# Patient Record
Sex: FEMALE | Race: BLACK OR AFRICAN AMERICAN | NOT HISPANIC OR LATINO | Employment: FULL TIME | URBAN - METROPOLITAN AREA
[De-identification: names, ages, dates, MRNs, and addresses within clinical notes are randomized per-mention and may not be internally consistent; named-entity substitution may affect disease eponyms.]

---

## 2020-02-21 LAB — POC BETA-HCG (QUAL): NEGATIVE

## 2022-04-11 ENCOUNTER — HISTORICAL (OUTPATIENT)
Dept: ADMINISTRATIVE | Facility: HOSPITAL | Age: 37
End: 2022-04-11

## 2022-04-24 VITALS
BODY MASS INDEX: 39.39 KG/M2 | DIASTOLIC BLOOD PRESSURE: 68 MMHG | SYSTOLIC BLOOD PRESSURE: 116 MMHG | HEIGHT: 70 IN | WEIGHT: 275.13 LBS

## 2022-05-01 ENCOUNTER — HOSPITAL ENCOUNTER (EMERGENCY)
Facility: HOSPITAL | Age: 37
Discharge: HOME | End: 2022-05-01
Attending: EMERGENCY MEDICINE
Payer: COMMERCIAL

## 2022-05-01 VITALS
OXYGEN SATURATION: 99 % | RESPIRATION RATE: 17 BRPM | SYSTOLIC BLOOD PRESSURE: 143 MMHG | BODY MASS INDEX: 41.16 KG/M2 | WEIGHT: 287.5 LBS | HEART RATE: 74 BPM | HEIGHT: 70 IN | DIASTOLIC BLOOD PRESSURE: 103 MMHG | TEMPERATURE: 98 F

## 2022-05-01 DIAGNOSIS — M51.36 LUMBAR DEGENERATIVE DISC DISEASE: ICD-10-CM

## 2022-05-01 DIAGNOSIS — M54.31 SCIATICA OF RIGHT SIDE: Primary | ICD-10-CM

## 2022-05-01 LAB
APPEARANCE UR: ABNORMAL
B-HCG UR QL: NEGATIVE
BACTERIA #/AREA URNS AUTO: ABNORMAL /HPF
BILIRUB UR QL STRIP.AUTO: NEGATIVE MG/DL
COLOR UR AUTO: COLORLESS
CTP QC/QA: YES
GLUCOSE UR QL STRIP.AUTO: NORMAL MG/DL
HYALINE CASTS #/AREA URNS LPF: ABNORMAL /LPF
KETONES UR QL STRIP.AUTO: NEGATIVE MG/DL
LEUKOCYTE ESTERASE UR QL STRIP.AUTO: 75 UNIT/L
NITRITE UR QL STRIP.AUTO: NEGATIVE
PH UR STRIP.AUTO: 6 [PH]
PROT UR QL STRIP.AUTO: NEGATIVE MG/DL
RBC UR QL AUTO: ABNORMAL UNIT/L
SP GR UR STRIP.AUTO: 1.02
SQUAMOUS #/AREA URNS LPF: ABNORMAL /HPF
UROBILINOGEN UR STRIP-ACNC: 0.2 MG/DL
WBC #/AREA URNS AUTO: ABNORMAL /HPF

## 2022-05-01 PROCEDURE — 99284 EMERGENCY DEPT VISIT MOD MDM: CPT | Mod: 25

## 2022-05-01 PROCEDURE — 87077 CULTURE AEROBIC IDENTIFY: CPT | Performed by: EMERGENCY MEDICINE

## 2022-05-01 PROCEDURE — 81001 URINALYSIS AUTO W/SCOPE: CPT | Performed by: PHYSICIAN ASSISTANT

## 2022-05-01 PROCEDURE — 63600175 PHARM REV CODE 636 W HCPCS: Performed by: PHYSICIAN ASSISTANT

## 2022-05-01 PROCEDURE — 96372 THER/PROPH/DIAG INJ SC/IM: CPT | Performed by: PHYSICIAN ASSISTANT

## 2022-05-01 PROCEDURE — 81025 URINE PREGNANCY TEST: CPT | Performed by: PHYSICIAN ASSISTANT

## 2022-05-01 RX ORDER — AMOXICILLIN 500 MG/1
500 CAPSULE ORAL EVERY 12 HOURS
COMMUNITY
Start: 2022-04-27 | End: 2023-03-10

## 2022-05-01 RX ORDER — METHYLPREDNISOLONE SOD SUCC 125 MG
125 VIAL (EA) INJECTION
Status: COMPLETED | OUTPATIENT
Start: 2022-05-01 | End: 2022-05-01

## 2022-05-01 RX ORDER — CYCLOBENZAPRINE HCL 10 MG
10 TABLET ORAL 3 TIMES DAILY PRN
Qty: 15 TABLET | Refills: 0 | Status: SHIPPED | OUTPATIENT
Start: 2022-05-01 | End: 2022-05-06

## 2022-05-01 RX ORDER — KETOROLAC TROMETHAMINE 30 MG/ML
30 INJECTION, SOLUTION INTRAMUSCULAR; INTRAVENOUS
Status: COMPLETED | OUTPATIENT
Start: 2022-05-01 | End: 2022-05-01

## 2022-05-01 RX ORDER — PROMETHAZINE HYDROCHLORIDE AND DEXTROMETHORPHAN HYDROBROMIDE 6.25; 15 MG/5ML; MG/5ML
5 SYRUP ORAL
COMMUNITY
Start: 2022-04-27 | End: 2023-03-10

## 2022-05-01 RX ORDER — INDOMETHACIN 25 MG/1
25 CAPSULE ORAL
Qty: 20 CAPSULE | Refills: 0 | Status: SHIPPED | OUTPATIENT
Start: 2022-05-01 | End: 2023-03-10

## 2022-05-01 RX ORDER — PREDNISONE 20 MG/1
20 TABLET ORAL 2 TIMES DAILY
Qty: 10 TABLET | Refills: 0 | Status: SHIPPED | OUTPATIENT
Start: 2022-05-01 | End: 2022-05-06

## 2022-05-01 RX ADMIN — METHYLPREDNISOLONE SODIUM SUCCINATE 125 MG: 125 INJECTION, POWDER, FOR SOLUTION INTRAMUSCULAR; INTRAVENOUS at 08:05

## 2022-05-01 RX ADMIN — KETOROLAC TROMETHAMINE 30 MG: 30 INJECTION, SOLUTION INTRAMUSCULAR; INTRAVENOUS at 08:05

## 2022-05-01 NOTE — ED TRIAGE NOTES
Chronic Back pain for several months. Worse over the last few days. Denies fall or trauma. AAOx4 NAD respirations even and unlabored. VSS

## 2022-05-01 NOTE — Clinical Note
"Daisy"Desiree Torre was seen and treated in our emergency department on 5/1/2022.  She may return to work on 05/04/2022.       If you have any questions or concerns, please don't hesitate to call.      DENNISE ROGERS RN    "

## 2022-05-02 NOTE — ED PROVIDER NOTES
Encounter Date: 5/1/2022       History     Chief Complaint   Patient presents with    Back Pain     35 yo F w/ PMHx significant for smoking, obesity & low back pain presents to ED c/o 3 month hx worsening of low back pain. Denies any injury prior to onset of symptoms. Patient has MRI in system from 2014 which shows paracentral herniated disc at L4-L5 & bulging annulus fibrosis at L5-S1. Patient doesn't remember having this done & states she never followed up w/ back specialist. Denies all red flag LBP symptoms. VSS on arrival w/ no signs of distress        Review of patient's allergies indicates:  No Known Allergies  No past medical history on file.  No past surgical history on file.  No family history on file.     Review of Systems   Constitutional: Negative for chills, fatigue, fever and unexpected weight change.   HENT: Negative for congestion and sore throat.    Respiratory: Negative for cough and shortness of breath.    Cardiovascular: Negative for chest pain, palpitations and leg swelling.   Gastrointestinal: Negative for blood in stool, constipation, diarrhea, nausea and vomiting.   Genitourinary: Negative for dysuria, hematuria, vaginal bleeding and vaginal discharge.   Musculoskeletal: Positive for back pain. Negative for joint swelling.   Skin: Negative for rash.   Neurological: Negative for syncope, weakness, light-headedness and numbness.        Denies LE paresthesia, saddle anesthesia, incontinence   Hematological: Does not bruise/bleed easily.       Physical Exam     Initial Vitals [05/01/22 1743]   BP Pulse Resp Temp SpO2   (!) 143/103 78 18 97.9 °F (36.6 °C) 100 %      MAP       --         Physical Exam    Constitutional: She appears well-developed and well-nourished. She is not diaphoretic. No distress.   HENT:   Head: Normocephalic and atraumatic.   Mouth/Throat: Oropharynx is clear and moist.   Eyes: Conjunctivae and EOM are normal. Pupils are equal, round, and reactive to light.   Neck: Neck  supple.   Normal range of motion.  Cardiovascular: Normal rate, regular rhythm, normal heart sounds and intact distal pulses. Exam reveals no gallop and no friction rub.    No murmur heard.  Pulmonary/Chest: Breath sounds normal. No respiratory distress. She has no wheezes. She has no rhonchi. She has no rales.   Abdominal: Abdomen is soft. Bowel sounds are normal. She exhibits no distension. There is no abdominal tenderness. There is no rebound and no guarding.   Musculoskeletal:         General: No edema.      Cervical back: Normal range of motion and neck supple.      Lumbar back: Spasms (R paraspinal muscles) and tenderness (R) present. No deformity, signs of trauma or bony tenderness. Positive right straight leg raise test.     Neurological: She is alert and oriented to person, place, and time. She has normal strength. She displays normal reflexes. No sensory deficit.   Skin: Skin is warm and dry. Capillary refill takes less than 2 seconds. No pallor.   Psychiatric: She has a normal mood and affect. Her behavior is normal. Judgment and thought content normal.         ED Course   Procedures  Labs Reviewed   URINALYSIS - Abnormal; Notable for the following components:       Result Value    Appearance, UA Turbid (*)     Blood, UA 3+ (*)     WBC, UA 11-20 (*)     Squamous Epithelial Cells, UA Few (*)     All other components within normal limits    Narrative:     RBC: >100   Ref Range: < 6-10   POCT URINE PREGNANCY          Imaging Results          X-Ray Lumbar Spine 2 Or 3 Views (In process)               X-Rays:   Independently Interpreted Readings:   Other Readings:  Lumbar spine: Chronic degenerative changes    Medications   ketorolac injection 30 mg (has no administration in time range)   methylPREDNISolone sodium succinate injection 125 mg (has no administration in time range)     Medical Decision Making:   Clinical Tests:   Lab Tests: Reviewed  Radiological Study: Reviewed  ED Management:  UA reveals blood,  but patient is on menstrual cycle. Results of UA otherwise unremarkable w/ no signs of UTI. XR of lumbar spine shows degenerative changes. Patient also has abnormal MRI in system. Benign neuro exam w/ no signs of symptoms of cauda equina. Will give IM meds here in ED & discharge w/ meds. Will refer patient to [facility] IM clinic & will also give patient's information to ED case manager in order to facilitate follow-up w/ back specialist                      Clinical Impression:   Final diagnoses:  [M54.31] Sciatica of right side (Primary)  [M51.36] Lumbar degenerative disc disease                 JESSICA Avila  05/01/22 2011

## 2022-05-07 LAB — BACTERIA UR CULT: ABNORMAL

## 2022-09-21 ENCOUNTER — HISTORICAL (OUTPATIENT)
Dept: ADMINISTRATIVE | Facility: HOSPITAL | Age: 37
End: 2022-09-21
Payer: COMMERCIAL

## 2022-12-29 ENCOUNTER — HOSPITAL ENCOUNTER (EMERGENCY)
Facility: HOSPITAL | Age: 37
Discharge: HOME | End: 2022-12-30
Attending: SPECIALIST
Payer: COMMERCIAL

## 2022-12-29 VITALS
HEIGHT: 70 IN | WEIGHT: 275 LBS | OXYGEN SATURATION: 98 % | BODY MASS INDEX: 39.37 KG/M2 | RESPIRATION RATE: 18 BRPM | HEART RATE: 90 BPM | TEMPERATURE: 98 F | SYSTOLIC BLOOD PRESSURE: 147 MMHG | DIASTOLIC BLOOD PRESSURE: 98 MMHG

## 2022-12-29 DIAGNOSIS — T78.40XA ALLERGIC REACTION, INITIAL ENCOUNTER: Primary | ICD-10-CM

## 2022-12-30 PROCEDURE — 25000003 PHARM REV CODE 250: Performed by: SPECIALIST

## 2022-12-30 PROCEDURE — 63600175 PHARM REV CODE 636 W HCPCS: Performed by: SPECIALIST

## 2022-12-30 PROCEDURE — 99284 EMERGENCY DEPT VISIT MOD MDM: CPT

## 2022-12-30 PROCEDURE — 96372 THER/PROPH/DIAG INJ SC/IM: CPT | Performed by: SPECIALIST

## 2022-12-30 RX ORDER — PREDNISONE 20 MG/1
20 TABLET ORAL 2 TIMES DAILY
Qty: 10 TABLET | Refills: 0 | Status: SHIPPED | OUTPATIENT
Start: 2022-12-30 | End: 2023-01-04

## 2022-12-30 RX ORDER — DEXAMETHASONE SODIUM PHOSPHATE 4 MG/ML
8 INJECTION, SOLUTION INTRA-ARTICULAR; INTRALESIONAL; INTRAMUSCULAR; INTRAVENOUS; SOFT TISSUE
Status: COMPLETED | OUTPATIENT
Start: 2022-12-30 | End: 2022-12-30

## 2022-12-30 RX ORDER — HYDROXYZINE PAMOATE 25 MG/1
25 CAPSULE ORAL
Status: COMPLETED | OUTPATIENT
Start: 2022-12-30 | End: 2022-12-30

## 2022-12-30 RX ORDER — HYDROXYZINE HYDROCHLORIDE 25 MG/1
25 TABLET, FILM COATED ORAL EVERY 4 HOURS PRN
Qty: 20 TABLET | Refills: 0 | Status: SHIPPED | OUTPATIENT
Start: 2022-12-30 | End: 2023-03-10

## 2022-12-30 RX ADMIN — HYDROXYZINE PAMOATE 25 MG: 25 CAPSULE ORAL at 12:12

## 2022-12-30 RX ADMIN — DEXAMETHASONE SODIUM PHOSPHATE 8 MG: 4 INJECTION, SOLUTION INTRA-ARTICULAR; INTRALESIONAL; INTRAMUSCULAR; INTRAVENOUS; SOFT TISSUE at 12:12

## 2022-12-30 NOTE — ED PROVIDER NOTES
Encounter Date: 12/29/2022       History     Chief Complaint   Patient presents with    Rash     C/o generalized rash started yesterday last benadryl at 1200 today      Patient with skin eruption over her arms back and chest, pruritic, began yesterday, unknown etiology; no history of allergies or allergic reactions; no nausea, no shortness of breath, no throat issues, no trouble swallowing    The history is provided by the patient.   Rash   This is a new problem. The current episode started yesterday. The problem has been unchanged. The problem is associated with nothing.   Review of patient's allergies indicates:  No Known Allergies  History reviewed. No pertinent past medical history.  History reviewed. No pertinent surgical history.  History reviewed. No pertinent family history.     Review of Systems   Constitutional: Negative.    HENT: Negative.     Respiratory: Negative.     Cardiovascular: Negative.    Gastrointestinal: Negative.    Musculoskeletal: Negative.    Skin:  Positive for rash.   Neurological: Negative.    All other systems reviewed and are negative.    Physical Exam     Initial Vitals [12/29/22 2319]   BP Pulse Resp Temp SpO2   (!) 147/98 90 18 98 °F (36.7 °C) 98 %      MAP       --         Physical Exam    Nursing note and vitals reviewed.  Constitutional: She appears well-developed and well-nourished.   HENT:   Head: Normocephalic and atraumatic.   Eyes: EOM are normal. Pupils are equal, round, and reactive to light.   Neck: Neck supple.   Normal range of motion.  Cardiovascular:  Normal rate, regular rhythm, normal heart sounds and intact distal pulses.           Pulmonary/Chest: Breath sounds normal. She has no wheezes.   Abdominal: Abdomen is soft. There is no abdominal tenderness.   Musculoskeletal:         General: Normal range of motion.      Cervical back: Normal range of motion and neck supple.     Neurological: She is alert and oriented to person, place, and time. She has normal  strength.   Skin: Skin is warm and dry.   Erythematous papules and macules over arms , chest and back       ED Course   Procedures  Labs Reviewed - No data to display       Imaging Results    None          Medications   dexAMETHasone injection 8 mg (8 mg Intramuscular Given 12/30/22 0026)   hydrOXYzine pamoate capsule 25 mg (25 mg Oral Given 12/30/22 0025)     Medical Decision Making:   Differential Diagnosis:   Allergic reaction, viral eruption  ED Management:  This rash is pruritic rash and appears to be an allergic reaction to unknown agent                        Clinical Impression:   Final diagnoses:  [T78.40XA] Allergic reaction, initial encounter (Primary)        ED Disposition Condition    Discharge Stable          ED Prescriptions       Medication Sig Dispense Start Date End Date Auth. Provider    predniSONE (DELTASONE) 20 MG tablet Take 1 tablet (20 mg total) by mouth 2 (two) times daily. for 5 days 10 tablet 12/30/2022 1/4/2023 Dave Evangelista MD    hydrOXYzine HCL (ATARAX) 25 MG tablet Take 1 tablet (25 mg total) by mouth every 4 (four) hours as needed for Itching. 20 tablet 12/30/2022 -- Dave Evangelista MD          Follow-up Information       Follow up With Specialties Details Why Contact Info    [facility] - Emergency Dept Emergency Medicine  As needed [address] [facility] [id number] [phone]             Dvae Evangelista MD  12/30/22 2125

## 2023-01-22 ENCOUNTER — HOSPITAL ENCOUNTER (EMERGENCY)
Facility: HOSPITAL | Age: 38
Discharge: HOME | End: 2023-01-22
Attending: STUDENT IN AN ORGANIZED HEALTH CARE EDUCATION/TRAINING PROGRAM
Payer: COMMERCIAL

## 2023-01-22 VITALS
WEIGHT: 275 LBS | OXYGEN SATURATION: 100 % | BODY MASS INDEX: 39.37 KG/M2 | RESPIRATION RATE: 18 BRPM | TEMPERATURE: 98 F | HEIGHT: 70 IN | DIASTOLIC BLOOD PRESSURE: 97 MMHG | HEART RATE: 90 BPM | SYSTOLIC BLOOD PRESSURE: 138 MMHG

## 2023-01-22 VITALS
SYSTOLIC BLOOD PRESSURE: 139 MMHG | BODY MASS INDEX: 39.37 KG/M2 | OXYGEN SATURATION: 98 % | HEIGHT: 70 IN | RESPIRATION RATE: 20 BRPM | DIASTOLIC BLOOD PRESSURE: 74 MMHG | TEMPERATURE: 99 F | HEART RATE: 87 BPM | WEIGHT: 275 LBS

## 2023-01-22 DIAGNOSIS — S01.01XA LACERATION OF SCALP, INITIAL ENCOUNTER: Primary | ICD-10-CM

## 2023-01-22 DIAGNOSIS — S01.01XD LACERATION OF SCALP, SUBSEQUENT ENCOUNTER: Primary | ICD-10-CM

## 2023-01-22 PROCEDURE — 99282 EMERGENCY DEPT VISIT SF MDM: CPT | Mod: 25

## 2023-01-22 PROCEDURE — 12001 RPR S/N/AX/GEN/TRNK 2.5CM/<: CPT

## 2023-01-22 PROCEDURE — 99285 EMERGENCY DEPT VISIT HI MDM: CPT | Mod: 25,27

## 2023-01-22 PROCEDURE — 63600175 PHARM REV CODE 636 W HCPCS

## 2023-01-22 PROCEDURE — 90471 IMMUNIZATION ADMIN: CPT

## 2023-01-22 PROCEDURE — 90715 TDAP VACCINE 7 YRS/> IM: CPT

## 2023-01-22 PROCEDURE — 25000003 PHARM REV CODE 250

## 2023-01-22 RX ORDER — LIDOCAINE HYDROCHLORIDE 10 MG/ML
5 INJECTION, SOLUTION EPIDURAL; INFILTRATION; INTRACAUDAL; PERINEURAL
Status: COMPLETED | OUTPATIENT
Start: 2023-01-22 | End: 2023-01-22

## 2023-01-22 RX ORDER — LIDOCAINE HYDROCHLORIDE 10 MG/ML
5 INJECTION, SOLUTION EPIDURAL; INFILTRATION; INTRACAUDAL; PERINEURAL
Status: DISCONTINUED | OUTPATIENT
Start: 2023-01-22 | End: 2023-01-22

## 2023-01-22 RX ADMIN — TETANUS TOXOID, REDUCED DIPHTHERIA TOXOID AND ACELLULAR PERTUSSIS VACCINE, ADSORBED 0.5 ML: 5; 2.5; 8; 8; 2.5 SUSPENSION INTRAMUSCULAR at 11:01

## 2023-01-22 RX ADMIN — LIDOCAINE HYDROCHLORIDE 50 MG: 10 INJECTION, SOLUTION EPIDURAL; INFILTRATION; INTRACAUDAL; PERINEURAL at 11:01

## 2023-01-22 NOTE — ED NOTES
Three staples remain to pt lac to occipital head from previous ED visit today. One staple applied per Dr. Bingham to bleeding/open portion of laceration with bleeding currently controlled. Neuro intact.

## 2023-01-22 NOTE — ED PROVIDER NOTES
Encounter Date: 1/22/2023       History     Chief Complaint   Patient presents with    Head Laceration     Patient arrived by EMS thought a door was secure leaned back on it fell back hit head on floor denies any loc . Head laceration to back of head .      Patient is a 37-year-old  female no significant past medical history presents to the ER today due to a head laceration.  Patient states she thought she was leaning up against a closed door when she leaned up against the door opened and she fell and hit her head.  Had patient states that she would no loss of consciousness and is not on any blood thinners.  Patient states minimal blood loss.  Patient denies any neck pain, back pain, chest pain, abdominal pain.  Patient denies any chance that she may be pregnant.  Patient is not up-to-date on her tetanus vaccine.  Patient denies any fevers, chills, cough, congestion, chest pain, shortness of breath, abdominal pain.    Review of patient's allergies indicates:   Allergen Reactions    Tomato (solanum lycopersicum)      No past medical history on file.  No past surgical history on file.  No family history on file.     Review of Systems   Constitutional:  Negative for chills, fatigue and fever.   HENT:  Negative for congestion, sore throat and trouble swallowing.    Eyes:  Negative for pain and visual disturbance.   Respiratory:  Negative for cough, shortness of breath and wheezing.    Cardiovascular:  Negative for chest pain and palpitations.   Gastrointestinal:  Negative for abdominal pain, blood in stool, constipation, diarrhea, nausea and vomiting.   Genitourinary:  Negative for dysuria and hematuria.   Musculoskeletal:  Negative for back pain and myalgias.   Skin:  Positive for wound. Negative for rash.   Neurological:  Negative for seizures, syncope and headaches.   Psychiatric/Behavioral:  Negative for confusion. The patient is not nervous/anxious.      Physical Exam     Initial Vitals [01/22/23  1042]   BP Pulse Resp Temp SpO2   139/74 87 20 98.6 °F (37 °C) 98 %      MAP       --         Physical Exam    Nursing note and vitals reviewed.  Constitutional: She appears well-developed and well-nourished. She is not diaphoretic. No distress.   HENT:   Head: Normocephalic.       Right Ear: External ear normal.   Left Ear: External ear normal.   Nose: Nose normal.   There is a 2 cm linear laceration to the left posterior aspect of the scalp.  No active bleeding on exam.  Wound does not appear contaminated.   Eyes: Conjunctivae and EOM are normal. Right eye exhibits no discharge. Left eye exhibits no discharge. No scleral icterus.   Neck:   Normal range of motion.  Cardiovascular:  Normal rate, regular rhythm and normal heart sounds.     Exam reveals no gallop and no friction rub.       No murmur heard.  Pulmonary/Chest: Breath sounds normal. No stridor. No respiratory distress. She has no wheezes. She has no rhonchi. She has no rales.   Musculoskeletal:         General: No tenderness or edema. Normal range of motion.      Cervical back: Normal range of motion.      Comments: There is no C, T, L-spine tenderness no step-off lesions.  Full range of motion of the neck both forward flexion lateral rotation.  Not limited due to pain.     Neurological: She is alert. No cranial nerve deficit.   Skin: Skin is warm. No rash noted. No erythema.   Psychiatric: She has a normal mood and affect. Her behavior is normal.       ED Course   Lac Repair    Date/Time: 1/22/2023 11:43 AM  Performed by: Sebastian Bingham MD  Authorized by: Sebastian Bingham MD     Consent:     Consent obtained:  Verbal    Consent given by:  Patient    Risks, benefits, and alternatives were discussed: yes      Risks discussed:  Infection, need for additional repair, nerve damage, poor wound healing, poor cosmetic result, pain, retained foreign body, tendon damage and vascular damage    Alternatives discussed:  No treatment  Universal protocol:     Procedure  explained and questions answered to patient or proxy's satisfaction: yes      Relevant documents present and verified: yes      Test results available: yes      Imaging studies available: yes      Required blood products, implants, devices, and special equipment available: yes      Site/side marked: yes      Immediately prior to procedure, a time out was called: yes      Patient identity confirmed:  Verbally with patient, arm band, provided demographic data and hospital-assigned identification number  Anesthesia:     Anesthesia method:  Local infiltration    Local anesthetic:  Lidocaine 1% w/o epi  Laceration details:     Location:  Scalp    Scalp location:  Occipital    Length (cm):  2    Depth (mm):  4  Pre-procedure details:     Preparation:  Patient was prepped and draped in usual sterile fashion and imaging obtained to evaluate for foreign bodies  Exploration:     Limited defect created (wound extended): no      Hemostasis achieved with:  Direct pressure    Imaging outcome: foreign body not noted      Wound exploration: wound explored through full range of motion and entire depth of wound visualized      Contaminated: no    Treatment:     Area cleansed with:  Saline    Amount of cleaning:  Extensive    Irrigation solution:  Sterile saline    Irrigation volume:  100    Irrigation method:  Pressure wash    Visualized foreign bodies/material removed: no      Debridement:  None    Undermining:  None    Scar revision: no    Skin repair:     Repair method:  Staples    Number of staples:  3  Approximation:     Approximation:  Close  Repair type:     Repair type:  Simple  Post-procedure details:     Dressing:  Bulky dressing    Procedure completion:  Tolerated  Labs Reviewed - No data to display       Imaging Results              CT Head Without Contrast (Final result)  Result time 01/22/23 11:28:25      Final result by Luis Houston MD (01/22/23 11:28:25)                   Impression:      1. No acute intracranial  abnormality.  2. Soft tissue laceration as above.      Electronically signed by: Luis Houston MD  Date:    01/22/2023  Time:    11:28               Narrative:    EXAMINATION:  CT HEAD WITHOUT CONTRAST    CLINICAL HISTORY:  Head trauma, minor, normal mental status (Age 19-64y);    TECHNIQUE:  Low dose axial CT images obtained throughout the head without intravenous contrast. Sagittal and coronal reconstructions were performed.  An automated dose exposure technique was utilized.  This limits radiation does the patient.    COMPARISON:  None.    FINDINGS:  Intracranial compartment:    Ventricles and sulci are normal in size for age without evidence of hydrocephalus. No extra-axial blood or fluid collections.    The brain parenchyma appears normal. No parenchymal mass, hemorrhage, edema or major vascular distribution infarct.    Skull/extracranial contents (limited evaluation): No fracture. Mastoid air cells and paranasal sinuses are essentially clear.  Soft tissue laceration is identified in the high parietal region with subcutaneous emphysema.  No evidence for radiopaque foreign body.                                       Medications   LIDOcaine (PF) 10 mg/ml (1%) injection 50 mg (has no administration in time range)   LIDOcaine (PF) 10 mg/ml (1%) injection 50 mg (50 mg Infiltration Given by Provider 1/22/23 1100)   Tdap (BOOSTRIX) vaccine injection 0.5 mL (0.5 mLs Intramuscular Given 1/22/23 1104)     Medical Decision Making:   Initial Assessment:   No acute distress 37-year-old female no active bleeding on exam  Differential Diagnosis:   Head laceration, skull fracture, intracranial bleed, foreign body retention  Clinical Tests:   Radiological Study: Ordered and Reviewed  ED Management:  Vital signs stable patient is afebrile   GCS 15   CT head unremarkable   Laceration repaired with staples   Staple removal in 5-7 days   Keep area clean and dry and apply triple antibiotic ointment to the area affected   Apply ice for  30 minute intervals to reduce hematoma formation   Return for staple removal  Return precautions discussed and follow up with PCP is recommended                        Clinical Impression:   Final diagnoses:  [S01.01XA] Laceration of scalp, initial encounter (Primary)        ED Disposition Condition    Discharge Stable          ED Prescriptions    None       Follow-up Information       Follow up With Specialties Details Why Contact Info    [facility] - Emergency Dept Emergency Medicine In 6 days For suture removal [address] [facility] [id number] [phone]             Sebastian Bingham MD  01/22/23 1143       Sebastian Bingham MD  01/22/23 1144

## 2023-01-22 NOTE — ED PROVIDER NOTES
Encounter Date: 1/22/2023       History     Chief Complaint   Patient presents with    Head Injury     Pt just discharged after a fall that resulted in staples to scalp; pt states when she got home bleeding continued and feels like there may be another area that is open      37-year-old  female no significant past medical history presented to the after subsequent encounter for head laceration.  CT head was negative last visit.  Laceration was repaired with 3 staples.  Patient returns stating that are still oozing blood.  Patient denies any new injuries.  Patient denies any other complaints.    Review of patient's allergies indicates:   Allergen Reactions    Tomato (solanum lycopersicum)      No past medical history on file.  No past surgical history on file.  No family history on file.     Review of Systems   Constitutional:  Negative for chills, fatigue and fever.   HENT:  Negative for congestion, sore throat and trouble swallowing.    Eyes:  Negative for pain and visual disturbance.   Respiratory:  Negative for cough, shortness of breath and wheezing.    Cardiovascular:  Negative for chest pain and palpitations.   Gastrointestinal:  Negative for abdominal pain, blood in stool, constipation, diarrhea, nausea and vomiting.   Genitourinary:  Negative for dysuria and hematuria.   Musculoskeletal:  Negative for back pain and myalgias.   Skin:  Positive for wound. Negative for rash.   Neurological:  Negative for seizures, syncope and headaches.   Psychiatric/Behavioral:  Negative for confusion. The patient is not nervous/anxious.      Physical Exam     Initial Vitals [01/22/23 1523]   BP Pulse Resp Temp SpO2   (!) 138/97 90 18 98 °F (36.7 °C) 100 %      MAP       --         Physical Exam    Nursing note and vitals reviewed.  Constitutional: She appears well-developed and well-nourished. She is not diaphoretic. No distress.   HENT:   Head: Normocephalic.       Right Ear: External ear normal.   Left Ear:  External ear normal.   Nose: Nose normal.   2 cm linear laceration 3 staples in place.  Slight oozing of the inferior border.  Blood-tinged discharge.   Eyes: Conjunctivae and EOM are normal. Right eye exhibits no discharge. Left eye exhibits no discharge. No scleral icterus.   Neck:   Normal range of motion.  Cardiovascular:  Normal rate, regular rhythm and normal heart sounds.     Exam reveals no gallop and no friction rub.       No murmur heard.  Pulmonary/Chest: Breath sounds normal. No stridor. No respiratory distress. She has no wheezes. She has no rhonchi. She has no rales.   Musculoskeletal:         General: Normal range of motion.      Cervical back: Normal range of motion.     Neurological: She is alert.   Skin: Skin is warm. No rash noted. No erythema.   Psychiatric: She has a normal mood and affect. Her behavior is normal.       ED Course   Procedures  Labs Reviewed - No data to display       Imaging Results    None          Medications - No data to display  Medical Decision Making:   Initial Assessment:   Overall well-appearing 37-year-old female  Differential Diagnosis:   Head laceration  Clinical Tests:   Radiological Study: Ordered and Reviewed  ED Management:  Vital signs stable patient is afebrile   Laceration repair about 1 hour ago   Slight oozing of blood at the inferior border  CT head is already been negative   One staple placed and bleeding ceased   Previous recommendations still in place remove staples in 6 days and apply ointment triple antibiotic to the area affected keep area clean and dry   All questions answered in layman's terms and return precautions discussed                        Clinical Impression:   Final diagnoses:  [S01.01XD] Laceration of scalp, subsequent encounter (Primary)        ED Disposition Condition    Discharge Stable          ED Prescriptions    None       Follow-up Information       Follow up With Specialties Details Why Contact [facility]    [facility] -  Emergency Dept Emergency Medicine In 6 days If symptoms worsen, For suture removal [address] [facility] [id number] [phone]             Sebastian Bingham MD  01/22/23 2287

## 2023-01-30 ENCOUNTER — HOSPITAL ENCOUNTER (EMERGENCY)
Facility: HOSPITAL | Age: 38
Discharge: HOME | End: 2023-01-30
Attending: FAMILY MEDICINE
Payer: COMMERCIAL

## 2023-01-30 VITALS
DIASTOLIC BLOOD PRESSURE: 95 MMHG | HEART RATE: 87 BPM | SYSTOLIC BLOOD PRESSURE: 149 MMHG | OXYGEN SATURATION: 100 % | HEIGHT: 70 IN | BODY MASS INDEX: 38.65 KG/M2 | WEIGHT: 270 LBS | TEMPERATURE: 98 F | RESPIRATION RATE: 20 BRPM

## 2023-01-30 DIAGNOSIS — Z48.02 REMOVAL OF STAPLE: Primary | ICD-10-CM

## 2023-01-30 PROCEDURE — 99281 EMR DPT VST MAYX REQ PHY/QHP: CPT

## 2023-01-30 NOTE — ED PROVIDER NOTES
Encounter Date: 1/30/2023       History   No chief complaint on file.    37-year-old female here for staple removal to the posterior head.    The history is provided by the patient. No  was used.   Review of patient's allergies indicates:   Allergen Reactions    Tomato (solanum lycopersicum)      No past medical history on file.  No past surgical history on file.  No family history on file.     Review of Systems   Constitutional: Negative.    HENT: Negative.     Eyes: Negative.    Respiratory: Negative.     Cardiovascular: Negative.    Gastrointestinal: Negative.    Endocrine: Negative.    Genitourinary: Negative.    Musculoskeletal: Negative.    Skin:  Positive for wound.        Staple removal to posterior scalp   Allergic/Immunologic: Negative.    Neurological: Negative.    Hematological: Negative.    Psychiatric/Behavioral: Negative.     All other systems reviewed and are negative.    Physical Exam     Initial Vitals   BP Pulse Resp Temp SpO2   01/30/23 1533 01/30/23 1531 01/30/23 1531 01/30/23 1531 01/30/23 1531   (!) 149/95 87 20 97.9 °F (36.6 °C) 100 %      MAP       --                Physical Exam    Vitals reviewed.  Constitutional: She appears well-developed and well-nourished.   HENT:   Head: Normocephalic and atraumatic.   Right Ear: External ear normal.   Left Ear: External ear normal.   Nose: Nose normal.   Mouth/Throat: Oropharynx is clear and moist.   Eyes: Conjunctivae and EOM are normal. Pupils are equal, round, and reactive to light.   Neck: Neck supple.   Normal range of motion.  Cardiovascular:  Normal rate, regular rhythm, normal heart sounds and intact distal pulses.           Pulmonary/Chest: Breath sounds normal.   Abdominal: Abdomen is soft. Bowel sounds are normal.   Musculoskeletal:         General: Normal range of motion.      Cervical back: Normal range of motion and neck supple.     Neurological: She is alert and oriented to person, place, and time. She has normal  strength. GCS score is 15. GCS eye subscore is 4. GCS verbal subscore is 5. GCS motor subscore is 6.   Skin: Skin is warm. Capillary refill takes 2 to 3 seconds.   Staple removal   Psychiatric: She has a normal mood and affect.       ED Course   Procedures  Labs Reviewed - No data to display       Imaging Results    None          Medications - No data to display                           Clinical Impression:   Final diagnoses:  [Z48.02] Removal of staple (Primary)        ED Disposition Condition    Discharge Stable          ED Prescriptions    None       Follow-up Information    None          Katherin Becker NP  01/30/23 5029

## 2023-03-10 ENCOUNTER — HOSPITAL ENCOUNTER (EMERGENCY)
Facility: HOSPITAL | Age: 38
Discharge: HOME | End: 2023-03-10
Attending: EMERGENCY MEDICINE
Payer: COMMERCIAL

## 2023-03-10 VITALS
TEMPERATURE: 98 F | OXYGEN SATURATION: 97 % | DIASTOLIC BLOOD PRESSURE: 88 MMHG | HEIGHT: 70 IN | HEART RATE: 100 BPM | SYSTOLIC BLOOD PRESSURE: 125 MMHG | RESPIRATION RATE: 20 BRPM | BODY MASS INDEX: 38.65 KG/M2 | WEIGHT: 270 LBS

## 2023-03-10 DIAGNOSIS — S01.81XA FACIAL LACERATION, INITIAL ENCOUNTER: Primary | ICD-10-CM
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
## 2023-03-10 DIAGNOSIS — S00.83XA FACIAL CONTUSION, INITIAL ENCOUNTER: ICD-10-CM

## 2023-03-10 PROCEDURE — 12011 RPR F/E/E/N/L/M 2.5 CM/<: CPT

## 2023-03-10 PROCEDURE — 25000003 PHARM REV CODE 250

## 2023-03-10 PROCEDURE — 99284 EMERGENCY DEPT VISIT MOD MDM: CPT | Mod: 25

## 2023-03-10 RX ORDER — LIDOCAINE HYDROCHLORIDE 10 MG/ML
INJECTION INFILTRATION; PERINEURAL
Status: COMPLETED
Start: 2023-03-10 | End: 2023-03-10

## 2023-03-10 RX ORDER — GABAPENTIN 300 MG/1
300-600 CAPSULE ORAL NIGHTLY
COMMUNITY
Start: 2023-01-31 | End: 2023-09-07 | Stop reason: SDUPTHER

## 2023-03-10 RX ORDER — ACETAMINOPHEN 500 MG
1000 TABLET ORAL
Status: DISCONTINUED | OUTPATIENT
Start: 2023-03-10 | End: 2023-03-10

## 2023-03-10 RX ORDER — LOSARTAN POTASSIUM 100 MG/1
100 TABLET ORAL
COMMUNITY
Start: 2023-02-27 | End: 2023-09-07 | Stop reason: SDUPTHER

## 2023-03-10 RX ORDER — HYDROCHLOROTHIAZIDE 12.5 MG/1
12.5 TABLET ORAL
COMMUNITY
Start: 2023-03-02 | End: 2023-09-07 | Stop reason: SDUPTHER

## 2023-03-10 RX ORDER — BACLOFEN 10 MG/1
10 TABLET ORAL 2 TIMES DAILY
COMMUNITY
Start: 2023-01-12

## 2023-03-10 RX ORDER — RILUZOLE 50 MG/1
50 TABLET, FILM COATED ORAL 2 TIMES DAILY
COMMUNITY
Start: 2023-02-27

## 2023-03-10 RX ADMIN — LIDOCAINE HYDROCHLORIDE: 10 INJECTION, SOLUTION INFILTRATION; PERINEURAL at 02:03

## 2023-03-10 NOTE — DISCHARGE INSTRUCTIONS
Do not apply any ointment or lotion over the glued laceration  May wash the sutured laceration and glued laceration with soap and water   Sutures out in 7 days   Head injury precautions for 24 hours

## 2023-03-10 NOTE — ED PROVIDER NOTES
Encounter Date: 3/10/2023       History     Chief Complaint   Patient presents with    Fall     Reports she tripped on a mat at the bank and fell today. Complains of left facial/cheek pain       37-year-old female presents to ER complaining of facial pain with abrasions and a small laceration to her upper lip.  She states she had a trip and fall on a mat at the bank just prior to arrival.  She denies any loss of consciousness.  She is had no vomiting or dizziness since fall.  Patient does have a history of ALS.    The history is provided by the patient. No  was used.   Review of patient's allergies indicates:   Allergen Reactions    Tomato (solanum lycopersicum)      Past Medical History:   Diagnosis Date    ALS (amyotrophic lateral sclerosis)     Hypertension      History reviewed. No pertinent surgical history.  No family history on file.  Social History     Tobacco Use    Smoking status: Never    Smokeless tobacco: Never     Review of Systems   Musculoskeletal:  Positive for neck pain.   Skin:  Positive for wound.   Neurological:  Negative for dizziness, syncope and light-headedness.   Hematological:  Does not bruise/bleed easily.   All other systems reviewed and are negative.    Physical Exam     Initial Vitals [03/10/23 1332]   BP Pulse Resp Temp SpO2   125/88 100 20 98.4 °F (36.9 °C) 97 %      MAP       --         Physical Exam    Constitutional: She appears well-developed and well-nourished.   HENT:   Head: Normocephalic.   Eyes: EOM are normal.   Neck: Neck supple.   Cardiovascular:  Normal rate, regular rhythm and normal heart sounds.           Pulmonary/Chest: Breath sounds normal. No respiratory distress.   Abdominal: She exhibits no distension.   Musculoskeletal:         General: Normal range of motion.      Cervical back: Neck supple.     Neurological: She is alert and oriented to person, place, and time.   Skin: Skin is warm and dry. Capillary refill takes less than 2 seconds.         Psychiatric: She has a normal mood and affect.       ED Course   Lac Repair    Date/Time: 3/10/2023 2:27 PM  Performed by: BARON Clarke  Authorized by: BARON Clarke     Consent:     Consent obtained:  Verbal    Consent given by:  Patient    Risks, benefits, and alternatives were discussed: yes      Risks discussed:  Poor cosmetic result and poor wound healing    Alternatives discussed:  No treatment  Universal protocol:     Immediately prior to procedure, a time out was called: yes      Patient identity confirmed:  Verbally with patient  Anesthesia:     Anesthesia method:  Local infiltration    Local anesthetic:  Lidocaine 1% w/o epi  Laceration details:     Location:  Face    Face location:  L cheek    Length (cm):  1  Pre-procedure details:     Preparation:  Patient was prepped and draped in usual sterile fashion and imaging obtained to evaluate for foreign bodies  Exploration:     Limited defect created (wound extended): no      Imaging outcome: foreign body not noted      Wound exploration: wound explored through full range of motion and entire depth of wound visualized      Contaminated: no    Treatment:     Area cleansed with:  Povidone-iodine    Amount of cleaning:  Standard    Debridement:  None    Undermining:  None    Scar revision: no    Skin repair:     Repair method:  Sutures    Suture size:  6-0    Suture material:  Nylon    Suture technique:  Simple interrupted    Number of sutures:  2  Approximation:     Approximation:  Close  Repair type:     Repair type:  Simple  Post-procedure details:     Procedure completion:  Tolerated well, no immediate complications  Lac Repair    Date/Time: 3/10/2023 2:39 PM  Performed by: BARON Clarke  Authorized by: BARON Clarke     Consent:     Consent obtained:  Verbal    Consent given by:  Patient    Risks discussed:  Poor wound healing and poor cosmetic result    Alternatives discussed:  No treatment  Universal protocol:      Procedure explained and questions answered to patient or proxy's satisfaction: yes      Immediately prior to procedure, a time out was called: yes      Patient identity confirmed:  Verbally with patient  Anesthesia:     Anesthesia method:  None  Laceration details:     Location:  Face    Face location:  Forehead    Length (cm):  1.5  Pre-procedure details:     Preparation:  Patient was prepped and draped in usual sterile fashion  Exploration:     Limited defect created (wound extended): no      Imaging outcome: foreign body not noted      Contaminated: no    Treatment:     Area cleansed with:  Chlorhexidine    Visualized foreign bodies/material removed: no      Debridement:  None    Scar revision: no    Skin repair:     Repair method:  Tissue adhesive  Approximation:     Approximation:  Close  Repair type:     Repair type:  Simple  Post-procedure details:     Procedure completion:  Tolerated well, no immediate complications  Labs Reviewed - No data to display       Imaging Results              CT Cervical Spine Without Contrast (Final result)  Result time 03/10/23 14:16:45      Final result by Jamal Lainez MD (03/10/23 14:16:45)                   Impression:      Loss of the normal lordotic curve of the cervical spine most likely related to spasm but otherwise unremarkable with no evidence of acute fracture or dislocation seen      Electronically signed by: Jamal Lainez  Date:    03/10/2023  Time:    14:16               Narrative:    EXAMINATION:  CT CERVICAL SPINE WITHOUT CONTRAST    CLINICAL HISTORY:  Neck trauma, dangerous injury mechanism (Age 16-64y);Fall facial injury and neck pain;    TECHNIQUE:  Low dose axial images, sagittal and coronal reformations were performed though the cervical spine.  Contrast was not administered. Automatic exposure control is utilized to reduce patient radiation exposure.    COMPARISON:  None    FINDINGS:  The vertebral body heights are well maintained. There is  some loss of the normal lordotic curve cervical spine most likely related to spasm. No fracture is seen. No dislocation is seen. The odontoid and lateral masses appear grossly unremarkable.                                       CT Maxillofacial Without Contrast (Final result)  Result time 03/10/23 14:10:51      Final result by Jamal Lainez MD (03/10/23 14:10:51)                   Impression:      No evidence of acute trauma      Electronically signed by: Jamal Lainez  Date:    03/10/2023  Time:    14:10               Narrative:    EXAMINATION:  CT MAXILLOFACIAL WITHOUT CONTRAST    CLINICAL HISTORY:  Facial trauma, blunt;    TECHNIQUE:  Low dose axial images, sagittal and coronal reformations were obtained through the face.  Contrast was not administered. Automatic exposure control is utilized to reduce patient radiation exposure.    COMPARISON:  None    FINDINGS:  The mandible is intact.  The maxilla is intact.    The zygoma is intact bilaterally.    The medial and lateral pterygoids are intact.    The orbits are intact.  No orbital fracture is seen.    The nasal bone is intact.    The paranasal sinuses appear normal..    No periorbital soft tissue swelling is seen.  No facial soft tissue swelling is seen.    The frontal bone is intact.                                       Medications   LIDOcaine HCL 10 mg/ml (1%) 10 mg/mL (1 %) injection (has no administration in time range)     Medical Decision Making:   Differential Diagnosis:   ICH, closed fracture, laceration, abrasion  Clinical Tests:   Radiological Study: Ordered and Reviewed  ED Management:    Laceration to left nasolabial  fold and left forehead repaired.  Patient instructed on wound care when to return for suture removal.  CT scan of the face and neck are without acute injury.  Discussed with patient and family member head injury precautions.  Patient remains neurovascular intact and will be discharged home with family member.                         Clinical Impression:   Final diagnoses:  [W19.XXXA] Fall, initial encounter  [S01.81XA] Facial laceration, initial encounter (Primary)  [S00.83XA] Facial contusion, initial encounter        ED Disposition Condition    Discharge Stable          ED Prescriptions    None       Follow-up Information    None          BARON Clarke  03/10/23 1447

## 2023-03-19 ENCOUNTER — HOSPITAL ENCOUNTER (EMERGENCY)
Facility: HOSPITAL | Age: 38
Discharge: HOME | End: 2023-03-19
Attending: STUDENT IN AN ORGANIZED HEALTH CARE EDUCATION/TRAINING PROGRAM
Payer: COMMERCIAL

## 2023-03-19 VITALS
BODY MASS INDEX: 37.94 KG/M2 | WEIGHT: 265 LBS | TEMPERATURE: 98 F | RESPIRATION RATE: 20 BRPM | OXYGEN SATURATION: 98 % | DIASTOLIC BLOOD PRESSURE: 86 MMHG | SYSTOLIC BLOOD PRESSURE: 123 MMHG | HEIGHT: 70 IN | HEART RATE: 101 BPM

## 2023-03-19 DIAGNOSIS — Z48.02 VISIT FOR SUTURE REMOVAL: Primary | ICD-10-CM

## 2023-03-19 PROCEDURE — 99282 EMERGENCY DEPT VISIT SF MDM: CPT

## 2023-03-19 NOTE — ED PROVIDER NOTES
Encounter Date: 3/19/2023       History     Chief Complaint   Patient presents with    Suture / Staple Removal     Pt reports here to removal sutures from upper lip no redness no swelling      37-year-old female presents to ED for suture removal to upper lip that was placed 9 days ago.  Denies any issues skin appears well healed.    Review of patient's allergies indicates:   Allergen Reactions    Tomato (solanum lycopersicum)      Past Medical History:   Diagnosis Date    ALS (amyotrophic lateral sclerosis)     Hypertension      No past surgical history on file.  No family history on file.  Social History     Tobacco Use    Smoking status: Never    Smokeless tobacco: Never     Review of Systems   Skin:  Positive for wound.   All other systems reviewed and are negative.    Physical Exam     Initial Vitals [03/19/23 1341]   BP Pulse Resp Temp SpO2   123/86 (!) 115 20 97.6 °F (36.4 °C) 99 %      MAP       --         Physical Exam    Constitutional: She appears well-developed and well-nourished.   HENT:   Head: Normocephalic.   Eyes: EOM are normal. Pupils are equal, round, and reactive to light.   Neck:   Normal range of motion.  Cardiovascular:  Normal rate, regular rhythm, normal heart sounds, intact distal pulses and normal pulses.           Pulmonary/Chest: Breath sounds normal. No respiratory distress.   Abdominal: Abdomen is soft. Bowel sounds are normal. There is no abdominal tenderness.   Musculoskeletal:         General: Normal range of motion.      Cervical back: Normal range of motion.     Neurological: She is alert.   Skin: Skin is warm. Capillary refill takes less than 2 seconds.   Left upper lip with 2 sutures in place, skin well healed, no erythema, edema, drainage   Psychiatric: She has a normal mood and affect.       ED Course   Suture Removal    Date/Time: 3/19/2023 3:38 PM  Location procedure was performed: [facility] EMERGENCY DEPARTMENT  Performed by: Marci Mars MD  Authorized by: Marci LEIVA  MD Jerad   Pre-operative diagnosis: upper lip laceration  Post-operative diagnosis: same  Body area: mouth  Description of findings: incision well healed   Wound Appearance: clean, well healed, nontender and no drainage  Sutures Removed: 2  Post-removal: no dressing applied  Complications: No  Estimated blood loss (mL): 0  Specimens: No  Implants: No  Patient tolerance: Patient tolerated the procedure well with no immediate complications      Labs Reviewed - No data to display       Imaging Results    None          Medications - No data to display  Medical Decision Making:   Differential Diagnosis:   Laceration, wound check, suture removal  ED Management:  Wound well healed sutures removed without issue, well tolerated  Wound care precautions reviewed  FU PCP  ER precautions given                        Clinical Impression:   Final diagnoses:  [Z48.02] Visit for suture removal (Primary)        ED Disposition Condition    Discharge Stable          ED Prescriptions    None       Follow-up Information       Follow up With Specialties Details Why Contact Info    Cathie Colmenares, DO Internal Medicine Schedule an appointment as soon as possible for a visit  As needed, If symptoms worsen [address] [place] [address] [address]  [facility] [address]  [phone]      [facility] - Emergency Dept Emergency Medicine  As needed, If symptoms worsen [address] [facility] [id number] [phone]             Marci Mars MD  03/19/23 6277

## 2023-03-28 ENCOUNTER — PATIENT MESSAGE (OUTPATIENT)
Dept: RESEARCH | Facility: HOSPITAL | Age: 38
End: 2023-03-28
Payer: COMMERCIAL

## 2023-08-24 ENCOUNTER — TELEPHONE (OUTPATIENT)
Dept: NEUROLOGY | Facility: CLINIC | Age: 38
End: 2023-08-24
Payer: MEDICARE

## 2023-08-24 NOTE — TELEPHONE ENCOUNTER
Spoke with the patient's son, Leyla. Offered next available appointment. leyla accepted next available, verbalized understanding, and repeated back date/time/location of scheduled appointment.   Asked that appt letter be mailed to mom's home.

## 2023-09-07 PROBLEM — G43.909 MIGRAINE HEADACHE: Status: ACTIVE | Noted: 2023-09-07

## 2023-09-07 PROBLEM — G12.21 ALS (AMYOTROPHIC LATERAL SCLEROSIS): Status: ACTIVE | Noted: 2023-06-09

## 2024-10-30 ENCOUNTER — LAB REQUISITION (OUTPATIENT)
Dept: LAB | Facility: HOSPITAL | Age: 39
End: 2024-10-30
Payer: COMMERCIAL

## 2024-10-30 DIAGNOSIS — Z51.5 ENCOUNTER FOR PALLIATIVE CARE: ICD-10-CM

## 2024-10-30 DIAGNOSIS — G89.29 OTHER CHRONIC PAIN: ICD-10-CM

## 2024-10-30 DIAGNOSIS — R53.1 WEAKNESS: ICD-10-CM

## 2024-10-30 DIAGNOSIS — G12.21 AMYOTROPHIC LATERAL SCLEROSIS: ICD-10-CM

## 2024-10-30 DIAGNOSIS — M54.40 LUMBAGO WITH SCIATICA, UNSPECIFIED SIDE: ICD-10-CM

## 2024-10-30 LAB
ALBUMIN SERPL-MCNC: 3.7 G/DL (ref 3.5–5)
ALBUMIN/GLOB SERPL: 1.2 RATIO (ref 1.1–2)
ALP SERPL-CCNC: 67 UNIT/L (ref 40–150)
ALT SERPL-CCNC: 17 UNIT/L (ref 0–55)
ANION GAP SERPL CALC-SCNC: 9 MEQ/L
AST SERPL-CCNC: 19 UNIT/L (ref 5–34)
BILIRUB SERPL-MCNC: 0.5 MG/DL
BUN SERPL-MCNC: 12.2 MG/DL (ref 7–18.7)
CALCIUM SERPL-MCNC: 9.2 MG/DL (ref 8.4–10.2)
CHLORIDE SERPL-SCNC: 106 MMOL/L (ref 98–107)
CK SERPL-CCNC: 180 U/L (ref 29–168)
CO2 SERPL-SCNC: 23 MMOL/L (ref 22–29)
CREAT SERPL-MCNC: 0.56 MG/DL (ref 0.55–1.02)
CREAT/UREA NIT SERPL: 22
GFR SERPLBLD CREATININE-BSD FMLA CKD-EPI: >60 ML/MIN/1.73/M2
GLOBULIN SER-MCNC: 3.2 GM/DL (ref 2.4–3.5)
GLUCOSE SERPL-MCNC: 74 MG/DL (ref 74–100)
POTASSIUM SERPL-SCNC: 3.9 MMOL/L (ref 3.5–5.1)
PROT SERPL-MCNC: 6.9 GM/DL (ref 6.4–8.3)
SODIUM SERPL-SCNC: 138 MMOL/L (ref 136–145)

## 2024-10-30 PROCEDURE — 80053 COMPREHEN METABOLIC PANEL: CPT | Performed by: PSYCHIATRY & NEUROLOGY

## 2024-10-30 PROCEDURE — 82550 ASSAY OF CK (CPK): CPT | Performed by: PSYCHIATRY & NEUROLOGY

## 2025-08-25 DIAGNOSIS — Z12.31 ENCOUNTER FOR SCREENING MAMMOGRAM FOR MALIGNANT NEOPLASM OF BREAST: Primary | ICD-10-CM
